# Patient Record
Sex: FEMALE | Race: BLACK OR AFRICAN AMERICAN
[De-identification: names, ages, dates, MRNs, and addresses within clinical notes are randomized per-mention and may not be internally consistent; named-entity substitution may affect disease eponyms.]

---

## 2019-03-15 LAB
ANION GAP SERPL CALC-SCNC: 13.8 MMOL/L (ref 8–16)
BASOPHILS # BLD AUTO: 0.1 10*3/UL (ref 0–0.1)
BASOPHILS NFR BLD AUTO: 1.1 % (ref 0–1)
BUN SERPL-MCNC: 22 MG/DL (ref 7–26)
BUN/CREAT SERPL: 17 (ref 6–25)
CALCIUM SERPL-MCNC: 8.8 MG/DL (ref 8.4–10.2)
CHLORIDE SERPL-SCNC: 103 MMOL/L (ref 98–107)
CO2 SERPL-SCNC: 26 MMOL/L (ref 22–29)
DEPRECATED NEUTROPHILS # BLD AUTO: 2 10*3/UL (ref 2.1–6.9)
EGFRCR SERPLBLD CKD-EPI 2021: 48 ML/MIN (ref 60–?)
EOSINOPHIL # BLD AUTO: 0.1 10*3/UL (ref 0–0.4)
EOSINOPHIL NFR BLD AUTO: 3.1 % (ref 0–6)
ERYTHROCYTE [DISTWIDTH] IN CORD BLOOD: 15.6 % (ref 11.7–14.4)
GLUCOSE SERPLBLD-MCNC: 91 MG/DL (ref 74–118)
HCT VFR BLD AUTO: 39.6 % (ref 34.2–44.1)
HGB BLD-MCNC: 13.4 G/DL (ref 12–16)
LYMPHOCYTES # BLD: 1.9 10*3/UL (ref 1–3.2)
LYMPHOCYTES NFR BLD AUTO: 41.9 % (ref 18–39.1)
MCH RBC QN AUTO: 28.2 PG (ref 28–32)
MCHC RBC AUTO-ENTMCNC: 33.8 G/DL (ref 31–35)
MCV RBC AUTO: 83.2 FL (ref 81–99)
MONOCYTES # BLD AUTO: 0.5 10*3/UL (ref 0.2–0.8)
MONOCYTES NFR BLD AUTO: 9.9 % (ref 4.4–11.3)
NEUTS SEG NFR BLD AUTO: 43.8 % (ref 38.7–80)
PLATELET # BLD AUTO: 347 X10E3/UL (ref 140–360)
POTASSIUM SERPL-SCNC: 3.8 MMOL/L (ref 3.5–5.1)
RBC # BLD AUTO: 4.76 X10E6/UL (ref 3.6–5.1)
SODIUM SERPL-SCNC: 139 MMOL/L (ref 136–145)

## 2019-03-15 NOTE — DIAGNOSTIC IMAGING REPORT
EXAM: CHEST 2 VIEWS, PA and lateral

DATE: 3/15/2019 Time stamp on exam: 3:14 PM

INDICATION: Preoperative

COMPARISON: None



FINDINGS:

LINES/TUBES: None



LUNGS: No consolidations or edema. 



PLEURA: No effusions or pneumothorax.



HEART AND MEDIASTINUM: Normal size and contour.



BONES AND SOFT TISSUES: No acute findings. Moderate to severe thoracolumbar

scoliosis is present. There is degenerative change of the spine.



IMPRESSION:

No acute thoracic abnormality.











Signed by: Dr. Bharath Alexis DO on 3/15/2019 3:44 PM

## 2019-03-18 ENCOUNTER — HOSPITAL ENCOUNTER (OUTPATIENT)
Dept: HOSPITAL 88 - OR | Age: 69
Setting detail: OBSERVATION
LOS: 1 days | Discharge: HOME HEALTH | End: 2019-03-19
Attending: SPECIALIST | Admitting: SPECIALIST
Payer: MEDICARE

## 2019-03-18 VITALS — DIASTOLIC BLOOD PRESSURE: 60 MMHG | SYSTOLIC BLOOD PRESSURE: 119 MMHG

## 2019-03-18 VITALS — BODY MASS INDEX: 32.61 KG/M2 | HEIGHT: 63 IN | WEIGHT: 184.02 LBS

## 2019-03-18 VITALS — DIASTOLIC BLOOD PRESSURE: 70 MMHG | SYSTOLIC BLOOD PRESSURE: 126 MMHG

## 2019-03-18 VITALS — SYSTOLIC BLOOD PRESSURE: 114 MMHG | DIASTOLIC BLOOD PRESSURE: 64 MMHG

## 2019-03-18 VITALS — DIASTOLIC BLOOD PRESSURE: 64 MMHG | SYSTOLIC BLOOD PRESSURE: 114 MMHG

## 2019-03-18 VITALS — SYSTOLIC BLOOD PRESSURE: 126 MMHG | DIASTOLIC BLOOD PRESSURE: 70 MMHG

## 2019-03-18 DIAGNOSIS — Z01.812: ICD-10-CM

## 2019-03-18 DIAGNOSIS — Z01.810: ICD-10-CM

## 2019-03-18 DIAGNOSIS — D64.9: ICD-10-CM

## 2019-03-18 DIAGNOSIS — I12.9: ICD-10-CM

## 2019-03-18 DIAGNOSIS — Z01.811: ICD-10-CM

## 2019-03-18 DIAGNOSIS — Z82.49: ICD-10-CM

## 2019-03-18 DIAGNOSIS — M17.12: Primary | ICD-10-CM

## 2019-03-18 DIAGNOSIS — N18.3: ICD-10-CM

## 2019-03-18 DIAGNOSIS — Z96.651: ICD-10-CM

## 2019-03-18 DIAGNOSIS — Z83.3: ICD-10-CM

## 2019-03-18 PROCEDURE — 85025 COMPLETE CBC W/AUTO DIFF WBC: CPT

## 2019-03-18 PROCEDURE — 85014 HEMATOCRIT: CPT

## 2019-03-18 PROCEDURE — 86850 RBC ANTIBODY SCREEN: CPT

## 2019-03-18 PROCEDURE — 86900 BLOOD TYPING SEROLOGIC ABO: CPT

## 2019-03-18 PROCEDURE — 73560 X-RAY EXAM OF KNEE 1 OR 2: CPT

## 2019-03-18 PROCEDURE — 80048 BASIC METABOLIC PNL TOTAL CA: CPT

## 2019-03-18 PROCEDURE — 36415 COLL VENOUS BLD VENIPUNCTURE: CPT

## 2019-03-18 PROCEDURE — 27447 TOTAL KNEE ARTHROPLASTY: CPT

## 2019-03-18 PROCEDURE — 93005 ELECTROCARDIOGRAM TRACING: CPT

## 2019-03-18 PROCEDURE — 86920 COMPATIBILITY TEST SPIN: CPT

## 2019-03-18 PROCEDURE — 71046 X-RAY EXAM CHEST 2 VIEWS: CPT

## 2019-03-18 PROCEDURE — 85018 HEMOGLOBIN: CPT

## 2019-03-18 PROCEDURE — 97116 GAIT TRAINING THERAPY: CPT

## 2019-03-18 PROCEDURE — 97530 THERAPEUTIC ACTIVITIES: CPT

## 2019-03-18 PROCEDURE — 97161 PT EVAL LOW COMPLEX 20 MIN: CPT

## 2019-03-18 RX ADMIN — CEFAZOLIN SODIUM SCH MLS/HR: 1 SOLUTION INTRAVENOUS at 23:34

## 2019-03-18 RX ADMIN — Medication SCH MG: at 18:05

## 2019-03-18 RX ADMIN — ACETAMINOPHEN SCH MG: 10 INJECTION, SOLUTION INTRAVENOUS at 18:05

## 2019-03-18 RX ADMIN — ACETAMINOPHEN SCH MG: 10 INJECTION, SOLUTION INTRAVENOUS at 12:32

## 2019-03-18 RX ADMIN — ACETAMINOPHEN SCH MG: 10 INJECTION, SOLUTION INTRAVENOUS at 23:34

## 2019-03-18 RX ADMIN — CEFAZOLIN SODIUM SCH MLS/HR: 1 SOLUTION INTRAVENOUS at 15:49

## 2019-03-18 NOTE — DIAGNOSTIC IMAGING REPORT
Exam: Left knee postoperative views dated 3/18/2019 at 11:10 AM



History:  S/P left knee replacement: 



Comparison: None available



Findings:  AP and lateral views show postoperative changes with surgical skin

staples and soft tissue air. Total knee replacement is in good position.





Impression:



Status post total knee replacement.



Signed by: Dr. Bharath Alexis DO on 3/18/2019 1:58 PM

## 2019-03-18 NOTE — XMS REPORT
Patient Summary Document

                             Created on: 2019



JACQUES SAAVEDRA

External Reference #: [id number]

: 1950

Sex: Female



Demographics







                          Address                   [id number] [place] [address] 

[place]  [address]

 

                          Home Phone                [phone]

 

                          Preferred Language        Unknown

 

                          Marital Status            Unknown

 

                          [employer] Affiliation     Unknown

 

                          Race                      Unknown

 

                                        Additional Race(s)  

 

                          Ethnic Group              Unknown





Author







                          [facility]                    [facility]

 

                          [facility]

 

                          Address                   Unknown

 

                          Phone                     Unavailable







Care Team Providers







                    Care Team Member Name    Role                Phone

 

                    AMERICA BEGUM     Unavailable         Unavailable







Problems

This patient has no known problems.



Allergies, Adverse Reactions, Alerts

This patient has no known allergies or adverse reactions.



Medications

This patient has no known medications.



Results







           Test Description    Test Time    Test Comments    Text Results    Atomic Results    Result

 Comments

 

                CHEST 2 VIEWS    2019-03-15 15:42:00                                                             

                                             [place] [address]  
   Patient Name: JACQUES SAAVEDRA                                   MR #: 
[id number]                     : 1950                                  
Age/Sex: 69/F  Acct #: [id number]                              Req #: [id number]
[id number]  Adm Physician:                                                      
Ordered by: AMERICA BEGUM MD                            Report #: [id number]    
   Location: OR                                      Room/Bed:                  
  
___________________________________________________________________________________________________
   Procedure: [id number] DX/CHEST 2 VIEWS  Exam Date:                            
Exam Time:                                               REPORT STATUS: Signed  
 EXAM: CHEST 2 VIEWS, PA and lateral   DATE: 3/15/2019 Time stamp on exam: 3:14 
PM   INDICATION: Preoperative   COMPARISON: None      FINDINGS:   LINES/TUBES: 
None      LUNGS: No consolidations or edema.       PLEURA: No effusions or 
pneumothorax.      HEART AND MEDIASTINUM: Normal size and contour.      BONES 
AND SOFT TISSUES: No acute findings. Moderate to severe thoracolumbar   
scoliosis is present. There is degenerative change of the spine.      
IMPRESSION:   No acute thoracic abnormality.                  Signed by: Dr. Ignacio Alexis DO on 3/15/2019 3:44 PM        Dictated By: IGNACIO ALEXIS DO  
Electronically Signed By: IGNACIO ALEXIS DO on 03/15/19 1544  Transcribed By: 
AKBAR on 03/15/19 1540       COPY TO:   AMERICA BEGUM MD

## 2019-03-18 NOTE — NUR
INITIAL ASSESSMENT COMPLETE, PT ON CPM NOW AT 55, TOLERATING WELL, FREEDMAN DRAINING, IV 
INTACT, ACE BANDAGE INTACT, NO DRAINAGE NOTED, CALL LIGHT IN REACH, FAMILY AT BEDSIDE, NO 
DISTRESS NOTED

## 2019-03-18 NOTE — NUR
Received patient from PACU. Patient A/O X3, even respirations on 2LNC. Bowel sounds active, 
no edema. Left knee wrapped at this time. Foot pumps bilaterally. Right AC 20 gauge IV with 
NS @ 100 cc/hr. Costa in place with clear yellow urine. Alexis hose on right leg. Pain 1/10 at 
this time in left knee. Call light in reach, will continue to monitor.

## 2019-03-18 NOTE — OPERATIVE REPORT
DATE OF PROCEDURE:  03/18/2019

 

SURGEON:  Scottie Celeste MD

 

ASSISTANT:  Sam Bullard PA-C.

 

PREOPERATIVE DIAGNOSIS:  Osteoarthritis, left knee.

 

POSTOPERATIVE DIAGNOSIS:  Osteoarthritis, left knee.

 

PROCEDURE:  Left total knee arthroplasty.

 

INDICATIONS:  The patient is a 69-year-old lady, who has end-stage valgus arthritis of

her left knee.  She has failed conservative management and would now like to proceed

with a left total knee replacement.  The risks and benefits have been discussed.  She

has been through a right knee replacement and is familiar with the procedure.  She

states she understands and wishes to proceed. 

 

DESCRIPTION OF PROCEDURE:  The patient was brought to the operating room and placed

under general anesthetic.  She received a regional block, prophylactic antibiotics and

tranexamic acid in the holding area.  Her left lower extremity was prepped and draped in

the sterile manner.  A preoperative time-out was performed.  The extremity was

exsanguinated and a proximal tourniquet was inflated to 300 mmHg.  An anterior approach

with a medial parapatellar arthrotomy was performed.  Clear synovial fluid was removed

from the joint.  Diminished muscle tone and large fatty infiltration of the

suprapatellar pouch was noted.  Limited medial exposure was performed due to the valgus

deformity.  The knee was brought up into flexion with the patella everted.  Marginal

osteophytes and meniscal remnants were removed.  An extramedullary cutting guide was

used to resect the proximal tibia.  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

deficient.  The PCL was sacrificed.  A Mccabe [employer] Nephew Jeanie II posterior stabilized

knee system was used throughout the case.  The tibial base was a size #4.  The central

fin punch was impacted and attention was directed towards the distal femur.  An

intramedullary cutting guide was used to resect the distal femur in 6 degrees of valgus

and rotation referencing off a combination of landmarks including Janeth's line, the

epicondylar axis and the posterior condyles.  Hypoplasia of the posterior lateral

femoral condyle was taken into account.  The femoral component was a size #5.  The

anterior and posterior cuts were made.  The knee was brought out into extension.  There

was a persistent lateral contracture.  A lamina  was placed.  A #11 blade was

used to pie cross the iliotibial band and release other structures to balance the knee

in flexion and extension.  An 11 mm posterior stabilized tibial insert provided

appropriate soft tissue balancing in flexion and extension.  The patella was resurfaced

with a 29 mm x 9 mm patellar button.  The thickness was checked before and after and was

right at 24 mm.  Patellar tracking was noted to be concentric.  The trial implants were

then removed.  A 100 mL premixed pericapsular injection was then placed into the

surrounding soft tissue.  The knee was thoroughly irrigated with a shower tip pulsatile

lavage.  The components were cemented into place using a single mix of [place] cement

preloaded with antibiotics.  Care was taken to remove extravasated cement with a [place]

elevator.  The knee was further irrigated with the shower tip pulsatile lavage and a

dilute Betadine solution while the cement cured.  The arthrotomy was then carefully

closed with interrupted #1 Ethibond.  The knee was put through flexion and extension to

ensure a secure closure of the arthrotomy and appropriate patellar tracking.  The skin

was carefully closed with subcuticular Vicryl and staples.  The skin was very thin and

frail.  A sterile Aquacel bandage was applied.  The patient was 

extubated and transported to the recovery room in stable condition.  Blood loss was

minimal.  All needle and sponge counts were correct. 

 

 

 

 

______________________________

Scottie Celeste MD DR/STEFAN

D:  03/18/2019 10:48:08

T:  03/18/2019 18:06:24

Job #:  [id number]

## 2019-03-19 VITALS — SYSTOLIC BLOOD PRESSURE: 110 MMHG | DIASTOLIC BLOOD PRESSURE: 60 MMHG

## 2019-03-19 VITALS — SYSTOLIC BLOOD PRESSURE: 127 MMHG | DIASTOLIC BLOOD PRESSURE: 68 MMHG

## 2019-03-19 VITALS — SYSTOLIC BLOOD PRESSURE: 125 MMHG | DIASTOLIC BLOOD PRESSURE: 64 MMHG

## 2019-03-19 VITALS — DIASTOLIC BLOOD PRESSURE: 56 MMHG | SYSTOLIC BLOOD PRESSURE: 106 MMHG

## 2019-03-19 LAB
HCT VFR BLD AUTO: 31.6 % (ref 34.2–44.1)
HGB BLD-MCNC: 10.7 G/DL (ref 12–16)

## 2019-03-19 RX ADMIN — HYDROCODONE BITARTRATE AND ACETAMINOPHEN PRN EA: 7.5; 325 TABLET ORAL at 13:16

## 2019-03-19 RX ADMIN — Medication SCH MG: at 08:22

## 2019-03-19 RX ADMIN — CEFAZOLIN SODIUM SCH MLS/HR: 1 SOLUTION INTRAVENOUS at 08:00

## 2019-03-19 RX ADMIN — HYDROCODONE BITARTRATE AND ACETAMINOPHEN PRN EA: 7.5; 325 TABLET ORAL at 08:17

## 2019-03-19 RX ADMIN — ACETAMINOPHEN SCH MG: 10 INJECTION, SOLUTION INTRAVENOUS at 06:00

## 2019-03-19 NOTE — NUR
RECEIVED PATIENT IN REPORT THIS MORNING. PATIENT AWAKE IN BED WITH CPM AT 55, NO PAIN 
EXPRESSED. NO S&S OR DISTRESS NOTED.

## 2019-03-19 NOTE — NUR
Rcvd patient in report this am. Patient is asleep in bed at this time. Patient in CPM at 
this time. No c/o pain.

## 2019-03-19 NOTE — NUR
DR JARA OFFICE PREARRANGED FOLLOWING DISCHARGE PLAN OF:

HOME HEALTH WITH [facility] [phone] WITH NEVIN DRAPER 3 IN ONE COMMODE. AND CPM PROVIDED BY [facility] [place] [phone] NATALIA LAU HAD SIGN GREEN SHEET GOT MD SIGNATURE AND FILED IN PACU

IMM SIGNED AND ON CHART COPY LEFT WITH PATIENT GAVE CARD FOR QUESTIONS AND OR CONCERNS.

## 2019-03-19 NOTE — NUR
Patient discharged at this time. Assisted out via wheelchair by staff. Discharge 
instructions, follow up appointment reminders, and rx's given. No questions reported.

## 2019-03-19 NOTE — CONSULTATION
DATE OF CONSULTATION:    

 

REASON FOR CONSULTATION:  Postoperative medical management.

 

HISTORY OF PRESENT ILLNESS:  The patient is a 69-year-old lady, status post left total

knee arthroplasty for end-stage osteoarthritis.  She is doing very well postoperatively

and has minimal pain at that time. 

 

REVIEW OF SYSTEMS:

She denies any fever, chills, nausea, vomiting, headache, shortness of breath, or

dizziness. 

 

PAST MEDICAL HISTORY:  Significant for osteoarthritis and hypertension.

 

MEDICATIONS:  See MAR.

 

ALLERGIES:  NONE.

 

SOCIAL HISTORY:  Nonsmoker, nondrinker.  .

 

FAMILY HISTORY:  Diabetes, heart disease, and hypertension.

 

PHYSICAL EXAMINATION:

VITAL SIGNS:  Temperature 96.4, pulse 75, blood pressure 106/56, and sats 94% on room

air. 

GENERAL:  No apparent distress. 

NECK:  Supple. 

LUNGS:  Clear to auscultation bilaterally. 

CARDIOVASCULAR:  Regular rate and rhythm. 

ABDOMEN:  Good bowel sounds.  Soft and nontender. 

EXTREMITIES:  No clubbing or cyanosis. 

NEUROLOGIC:  Nonfocal.

ASSESSMENT AND PLAN:  

1. Left knee pain.  Continue with physical therapy and pain control.

2. Anemia.  Check a CBC.

3. Chronic kidney disease stage 3.  Continue to monitor p.r.n.

4. Hypertension.  We will restart her home medications at discharge. 

 

Please see hospital chart for full details.

 

 

 

 

______________________________

MD NI Mendez/STEFAN

D:  03/19/2019 04:59:32

T:  03/19/2019 11:16:17

Job #:  [id number]